# Patient Record
Sex: FEMALE | Race: WHITE | Employment: FULL TIME | URBAN - METROPOLITAN AREA
[De-identification: names, ages, dates, MRNs, and addresses within clinical notes are randomized per-mention and may not be internally consistent; named-entity substitution may affect disease eponyms.]

---

## 2017-01-16 ENCOUNTER — OFFICE VISIT (OUTPATIENT)
Dept: SURGERY | Age: 37
End: 2017-01-16

## 2017-01-16 VITALS
HEIGHT: 68 IN | SYSTOLIC BLOOD PRESSURE: 110 MMHG | RESPIRATION RATE: 18 BRPM | TEMPERATURE: 98.9 F | HEART RATE: 68 BPM | BODY MASS INDEX: 36.53 KG/M2 | WEIGHT: 241 LBS | OXYGEN SATURATION: 98 % | DIASTOLIC BLOOD PRESSURE: 70 MMHG

## 2017-01-16 DIAGNOSIS — K91.2 POSTOPERATIVE INTESTINAL MALABSORPTION: ICD-10-CM

## 2017-01-16 DIAGNOSIS — D50.8 OTHER IRON DEFICIENCY ANEMIA: ICD-10-CM

## 2017-01-16 DIAGNOSIS — E53.8 B12 DEFICIENCY: ICD-10-CM

## 2017-01-16 DIAGNOSIS — E55.9 VITAMIN D DEFICIENCY: ICD-10-CM

## 2017-01-16 DIAGNOSIS — K59.01 SLOW TRANSIT CONSTIPATION: ICD-10-CM

## 2017-01-16 DIAGNOSIS — Z09 FOLLOW-UP EXAMINATION FOLLOWING SURGERY: Primary | ICD-10-CM

## 2017-01-16 RX ORDER — LACTULOSE 10 G/15ML
10-20 SOLUTION ORAL; RECTAL
Qty: 480 ML | Refills: 2 | Status: SHIPPED | OUTPATIENT
Start: 2017-01-16 | End: 2017-06-26

## 2017-01-16 NOTE — PATIENT INSTRUCTIONS
Constipation:    Take 15-30 ml of the lactulose nightly     Decrease amount of cheese you are eating     Increase your water intake     Call me in 2 weeks with update

## 2017-01-16 NOTE — PROGRESS NOTES
Chief Complaint   Patient presents with    Surgical Follow-up     3 months s/p gastric bypass by Dr Frances Norwood. down 57 pounds,  lost 22  pounds. Patient is 3 months status post Malabsorptive gastric bypass for treatment of morbid obesity. She  has lost 57 lbs. Since surgery. Patient is satisfied with progress. Patient is consuming about 45 grams of protein daily. Most foods are well tolerated, but she doesn't always have time to eat. She in in her car \"alot\" and tends to grab easy items. Her primary complaint is constipation. She has been eating sugar free jelly beans to be able to have a BM. She says she has tried everything and that is all that works. She is eating a lot of cheese (3-4 cheese sticks per day). Patient is walking more daily for activity. Patient has had rare  episodes of dysphagia due to poor chewing, eating too quickly and/or other behavioral factors. No fever or chills, chest pain or shortness of breath. No reflux, night-time cough, heartburn or regurgitation. Constipated every 2 weeks eats sugar free jelly beans     Visit Vitals    /70    Pulse 68    Temp 98.9 °F (37.2 °C)    Resp 18    Ht 5' 8\" (1.727 m)    Wt 241 lb (109.3 kg)    SpO2 98%    BMI 36.64 kg/m2     A + O x 3  Chest  CTA  COR  RRR  ABD Soft, well healed; NT/ND, +BS, no masses or hernias. EXT No edema; ambulating independently      ICD-10-CM ICD-9-CM    1. Follow-up examination following surgery Z09 V67.00    2. Postoperative intestinal malabsorption K91.2 579.3 CBC W/O DIFF      VITAMIN B12 & FOLATE      VITAMIN D, 25 HYDROXY      IRON      METABOLIC PANEL, COMPREHENSIVE      CANCELED: METABOLIC PANEL, COMPREHENSIVE      CANCELED: IRON   3. Slow transit constipation K59.01 564.01    4. Vitamin D deficiency E55.9 268.9 CBC W/O DIFF      VITAMIN B12 & FOLATE      VITAMIN D, 25 HYDROXY      IRON      METABOLIC PANEL, COMPREHENSIVE      CANCELED: METABOLIC PANEL, COMPREHENSIVE      CANCELED: IRON   5. BMI 36.0-36.9,adult Z68.36 V85.36 CBC W/O DIFF      VITAMIN B12 & FOLATE      VITAMIN D, 25 HYDROXY      IRON      METABOLIC PANEL, COMPREHENSIVE      CANCELED: METABOLIC PANEL, COMPREHENSIVE      CANCELED: IRON   6. B12 deficiency E53.8 266.2 CBC W/O DIFF      VITAMIN B12 & FOLATE      VITAMIN D, 25 HYDROXY      IRON      METABOLIC PANEL, COMPREHENSIVE      CANCELED: METABOLIC PANEL, COMPREHENSIVE      CANCELED: IRON   7. Other iron deficiency anemia D50.8  CBC W/O DIFF      VITAMIN B12 & FOLATE      VITAMIN D, 25 HYDROXY      IRON      METABOLIC PANEL, COMPREHENSIVE      CANCELED: METABOLIC PANEL, COMPREHENSIVE      CANCELED: IRON     3 months s/p Malabsorptive gastric bypass for treatment of morbid obesity   Labs today   Reviewed diet (decrease cheese intake and no jelly beans)  Try lactulose for constipation, increase fiber and water intake   Follow up in 3 months   Daily walking   Continue vitamins   RD available   Rhenda Click verbalized understanding and questions were answered to the best of my knowledge and ability. Constipation  educational materials were provided.

## 2017-01-16 NOTE — MR AVS SNAPSHOT
Visit Information Date & Time Provider Department Dept. Phone Encounter #  
 1/16/2017 11:20 AM Curly Oneil NP [facility] GENERAL SURGERY [address] [phone] [id number] Upcoming Health Maintenance Date Due HEMOGLOBIN A1C Q6M 1978 FOOT EXAM Q1 10/11/1988 MICROALBUMIN Q1 10/11/1988 EYE EXAM RETINAL OR DILATED Q1 10/11/1988 Pneumococcal 19-64 Medium Risk (1 of 1 - PPSV23) 10/11/1997 DTaP/Tdap/Td series (1 - Tdap) 10/11/1999 PAP AKA CERVICAL CYTOLOGY 10/11/1999 INFLUENZA AGE 9 TO ADULT 8/1/2016 LIPID PANEL Q1 9/16/2017 Allergies as of 1/16/2017  Review Complete On: 1/16/2017 By: Curly Oneil NP Severity Noted Reaction Type Reactions Anaprox [Naproxen Sodium]  07/19/2016    Shortness of Breath Bactrim [Sulfamethoprim Ds]  07/19/2016    Hives Phenergan [Promethazine]  07/19/2016    Other (comments) IV form, agitation, muscle spasms Sulfa (Sulfonamide Antibiotics)  09/15/2016    Hives Current Immunizations  Reviewed on 10/10/2016 No immunizations on file. Not reviewed this visit You Were Diagnosed With   
  
 Codes Comments Postoperative intestinal malabsorption    -  Primary ICD-10-CM: K91.2 ICD-9-CM: 579.3 Vitamin D deficiency     ICD-10-CM: E55.9 ICD-9-CM: 268.9 B12 deficiency     ICD-10-CM: E53.8 ICD-9-CM: 266.2 Other iron deficiency anemia     ICD-10-CM: D50.8 BMI 36.0-36.9,adult     ICD-10-CM: E49.45 
ICD-9-CM: V85.36 Vitals BP Pulse Temp Resp Height(growth percentile) Weight(growth percentile) 110/70 68 98.9 °F (37.2 °C) 18 5' 8\" (1.727 m) 241 lb (109.3 kg) SpO2 BMI Smoking Status 98% 36.64 kg/m2 Never Smoker Vitals History BMI and BSA Data Body Mass Index Body Surface Area  
 36.64 kg/m 2 2.29 m 2 Preferred Pharmacy Pharmacy Name Phone [id number] [place] [place] - [address] [place] [address] Your Updated Medication List  
  
   
This list is accurate as of: 1/16/17 12:07 PM.  Always use your most recent med list.  
  
  
  
  
 aspirin delayed-release 81 mg tablet Take  by mouth daily. atorvastatin 20 mg tablet Commonly known as:  LIPITOR Take  by mouth daily. BETA CAROTENE PO Take  by mouth. BIOTIN PO Take  by mouth. buPROPion  mg SR tablet Commonly known as:  Rosa Clovis Take  by mouth two (2) times a day. Calcium-Cholecalciferol (D3) 500 mg(1,250mg) -400 unit Tab Take  by mouth. carvedilol 3.125 mg tablet Commonly known as:  Alessandra Buster Take  by mouth two (2) times daily (with meals). cyanocobalamin 1,000 mcg sublingual tablet Commonly known as:  VITAMIN B-12 Take 1,000 mcg by mouth daily. FISH OIL PO Take 1,000 mg by mouth. lactulose 10 gram/15 mL solution Commonly known as:  Magdalene Efrem Take 15-30 mL by mouth nightly. Constipation  
  
 lisinopril 2.5 mg tablet Commonly known as:  Ej Rolle Take  by mouth daily. MIRENA 20 mcg/24 hr (5 years) IUD Generic drug:  levonorgestrel 1 Each by IntraUTERine route once. multivitamin tablet Commonly known as:  ONE A DAY Take 1 Tab by mouth daily. Omeprazole delayed release 20 mg tablet Commonly known as:  PRILOSEC D/R Take 1 Tab by mouth daily. sertraline 50 mg tablet Commonly known as:  ZOLOFT Take  by mouth daily. traZODone 100 mg tablet Commonly known as:  Darling Hernández Take 100 mg by mouth nightly. warfarin 7.5 mg tablet Commonly known as:  COUMADIN Take 7.5 mg by mouth daily. 7.5 MG Monday Wednesday AND Friday - AND THEN 1/2 TAB OF THE 7.5MG Tuesday Thursday Saturday AND Sunday. PT INSTRUCTED TO STOP TAKING COUMADIN ON OCT. 5TH BY CARDIOLOLIST AND TO START LOVENOX ONCE A DAY UNTIL SURGERY AND CONTINUE UNTIL AFTER SURGERY. Prescriptions Sent to Pharmacy Refills lactulose (CHRONULAC) 10 gram/15 mL solution 2 Sig: Take 15-30 mL by mouth nightly. Constipation Class: Normal  
 [facility] [address] Ph #: [phone] Route: Oral  
  
We Performed the Following CBC W/O DIFF [20721 CPT(R)] IRON [id number] CPT(R)] METABOLIC PANEL, COMPREHENSIVE [20051 CPT(R)] VITAMIN B12 & FOLATE [40153 CPT(R)] VITAMIN D, 25 HYDROXY [id number] CPT(R)] Patient Instructions Constipation:   
Take 15-30 ml of the lactulose nightly Decrease amount of cheese you are eating Increase your water intake Call me in 2 weeks with update Introducing \[facility]\"" & HEALTH SERVICES! Dear Oscar Caban: Thank you for requesting a [employer] account. Our records indicate that you already have an active [employer] account. You can access your account anytime at [email or web address] Did you know that you can access your hospital and ER discharge instructions at any time in [employer]? You can also review all of your test results from your hospital stay or ER visit. Additional Information If you have questions, please visit the Frequently Asked Questions section of the [employer] website at [email or web address] Remember, [employer] is NOT to be used for urgent needs. For medical emergencies, dial 911. Now available from your iPhone and Android! Please provide this summary of care documentation to your next provider. Your primary care clinician is listed as Traci Cavazos. If you have any questions after today's visit, please call [phone].

## 2017-01-17 LAB
25(OH)D3+25(OH)D2 SERPL-MCNC: 58 NG/ML (ref 30–100)
ALBUMIN SERPL-MCNC: 4.3 G/DL (ref 3.5–5.5)
ALBUMIN/GLOB SERPL: 1.7 {RATIO} (ref 1.1–2.5)
ALP SERPL-CCNC: 126 IU/L (ref 39–117)
ALT SERPL-CCNC: 40 IU/L (ref 0–32)
AST SERPL-CCNC: 30 IU/L (ref 0–40)
BILIRUB SERPL-MCNC: 0.6 MG/DL (ref 0–1.2)
BUN SERPL-MCNC: 10 MG/DL (ref 6–20)
BUN/CREAT SERPL: 16 (ref 8–20)
CALCIUM SERPL-MCNC: 9.5 MG/DL (ref 8.7–10.2)
CHLORIDE SERPL-SCNC: 104 MMOL/L (ref 96–106)
CO2 SERPL-SCNC: 20 MMOL/L (ref 18–29)
CREAT SERPL-MCNC: 0.63 MG/DL (ref 0.57–1)
ERYTHROCYTE [DISTWIDTH] IN BLOOD BY AUTOMATED COUNT: 14.1 % (ref 12.3–15.4)
FOLATE SERPL-MCNC: >20 NG/ML
GLOBULIN SER CALC-MCNC: 2.6 G/DL (ref 1.5–4.5)
GLUCOSE SERPL-MCNC: 82 MG/DL (ref 65–99)
HCT VFR BLD AUTO: 42 % (ref 34–46.6)
HGB BLD-MCNC: 13.9 G/DL (ref 11.1–15.9)
IRON SERPL-MCNC: 48 UG/DL (ref 27–159)
MCH RBC QN AUTO: 29.1 PG (ref 26.6–33)
MCHC RBC AUTO-ENTMCNC: 33.1 G/DL (ref 31.5–35.7)
MCV RBC AUTO: 88 FL (ref 79–97)
PLATELET # BLD AUTO: 332 X10E3/UL (ref 150–379)
POTASSIUM SERPL-SCNC: 4.4 MMOL/L (ref 3.5–5.2)
PROT SERPL-MCNC: 6.9 G/DL (ref 6–8.5)
RBC # BLD AUTO: 4.77 X10E6/UL (ref 3.77–5.28)
SODIUM SERPL-SCNC: 143 MMOL/L (ref 134–144)
VIT B12 SERPL-MCNC: 677 PG/ML (ref 211–946)
WBC # BLD AUTO: 8 X10E3/UL (ref 3.4–10.8)

## 2017-06-26 ENCOUNTER — OFFICE VISIT (OUTPATIENT)
Dept: SURGERY | Age: 37
End: 2017-06-26

## 2017-06-26 VITALS
BODY MASS INDEX: 35.01 KG/M2 | HEART RATE: 65 BPM | DIASTOLIC BLOOD PRESSURE: 78 MMHG | WEIGHT: 231 LBS | TEMPERATURE: 99 F | OXYGEN SATURATION: 94 % | SYSTOLIC BLOOD PRESSURE: 128 MMHG | HEIGHT: 68 IN | RESPIRATION RATE: 18 BRPM

## 2017-06-26 DIAGNOSIS — L30.4 INTERTRIGO: ICD-10-CM

## 2017-06-26 DIAGNOSIS — E66.01 SEVERE OBESITY (BMI 35.0-35.9 WITH COMORBIDITY) (HCC): ICD-10-CM

## 2017-06-26 DIAGNOSIS — K91.2 POSTOPERATIVE INTESTINAL MALABSORPTION: Primary | ICD-10-CM

## 2017-06-26 RX ORDER — NYSTATIN 100000 U/G
CREAM TOPICAL
Qty: 30 G | Refills: 0 | Status: SHIPPED | OUTPATIENT
Start: 2017-06-26 | End: 2020-01-16 | Stop reason: ALTCHOICE

## 2017-06-26 RX ORDER — GABAPENTIN 100 MG/1
200 CAPSULE ORAL
COMMUNITY
Start: 2017-06-23 | End: 2020-01-16 | Stop reason: ALTCHOICE

## 2017-06-26 RX ORDER — OXYCODONE AND ACETAMINOPHEN 5; 325 MG/1; MG/1
TABLET ORAL
COMMUNITY
Start: 2017-06-22 | End: 2020-01-16 | Stop reason: ALTCHOICE

## 2017-06-26 RX ORDER — DOXYCYCLINE 100 MG/1
100 CAPSULE ORAL
COMMUNITY
Start: 2017-06-22 | End: 2017-07-02

## 2017-06-26 NOTE — MR AVS SNAPSHOT
Visit Information Date & Time Provider Department Dept. Phone Encounter #  
 6/26/2017 11:40 AM Madiha Davis NP [facility] GENERAL SURGERY [address] [phone] [id number] Upcoming Health Maintenance Date Due HEMOGLOBIN A1C Q6M 1978 FOOT EXAM Q1 10/11/1988 MICROALBUMIN Q1 10/11/1988 EYE EXAM RETINAL OR DILATED Q1 10/11/1988 Pneumococcal 19-64 Medium Risk (1 of 1 - PPSV23) 10/11/1997 DTaP/Tdap/Td series (1 - Tdap) 10/11/1999 PAP AKA CERVICAL CYTOLOGY 10/11/1999 INFLUENZA AGE 9 TO ADULT 8/1/2017 LIPID PANEL Q1 9/16/2017 Allergies as of 6/26/2017  Review Complete On: 1/16/2017 By: Madiha Davis NP Severity Noted Reaction Type Reactions Anaprox [Naproxen Sodium]  07/19/2016    Shortness of Breath Bactrim [Sulfamethoprim Ds]  07/19/2016    Hives Phenergan [Promethazine]  07/19/2016    Other (comments) IV form, agitation, muscle spasms Sulfa (Sulfonamide Antibiotics)  09/15/2016    Hives Current Immunizations  Reviewed on 10/10/2016 No immunizations on file. Not reviewed this visit You Were Diagnosed With   
  
 Codes Comments Intertrigo    -  Primary ICD-10-CM: L30.4 ICD-9-CM: 695.89 Vitals BP Pulse Temp Resp Height(growth percentile) Weight(growth percentile) 128/78 65 99 °F (37.2 °C) 18 5' 8\" (1.727 m) 231 lb (104.8 kg) SpO2 BMI Smoking Status 94% 35.12 kg/m2 Never Smoker Vitals History BMI and BSA Data Body Mass Index Body Surface Area  
 35.12 kg/m 2 2.24 m 2 Preferred Pharmacy [facility] Name Phone [id number] Your Updated Medication List  
  
   
This list is accurate as of: 6/26/17 12:40 PM.  Always use your most recent med list.  
  
  
  
  
 aspirin delayed-release 81 mg tablet Take  by mouth daily. atorvastatin 20 mg tablet Commonly known as:  LIPITOR Take  by mouth daily. BETA CAROTENE PO Take  by mouth. BIOTIN PO Take  by mouth. buPROPion  mg SR tablet Commonly known as:  Gloria Gobble Take  by mouth two (2) times a day. Calcium-Cholecalciferol (D3) 500 mg(1,250mg) -400 unit Tab Take  by mouth. carvedilol 3.125 mg tablet Commonly known as:  Yanelis [place] Take  by mouth two (2) times daily (with meals). cyanocobalamin 1,000 mcg sublingual tablet Commonly known as:  VITAMIN B-12 Take 1,000 mcg by mouth daily. doxycycline 100 mg capsule Commonly known as:  Jeffory Squibb Take 100 mg by mouth. FISH OIL PO Take 1,000 mg by mouth.  
  
 gabapentin 100 mg capsule Commonly known as:  NEURONTIN Take 200 mg by mouth.  
  
 lisinopril 2.5 mg tablet Commonly known as:  Maury Daft Take  by mouth daily. MIRENA 20 mcg/24 hr (5 years) IUD Generic drug:  levonorgestrel 1 Each by IntraUTERine route once. multivitamin tablet Commonly known as:  ONE A DAY Take 1 Tab by mouth daily. nystatin topical cream  
Commonly known as:  MYCOSTATIN Apply  to affected area two (2) times daily as needed for Skin Irritation. oxyCODONE-acetaminophen 5-325 mg per tablet Commonly known as:  PERCOCET Take  by mouth. sertraline 50 mg tablet Commonly known as:  ZOLOFT Take  by mouth daily. traZODone 100 mg tablet Commonly known as:  Justice Tello Take 100 mg by mouth nightly. warfarin 7.5 mg tablet Commonly known as:  COUMADIN Take 7.5 mg by mouth daily. 7.5 MG Monday Wednesday AND Friday - AND THEN 1/2 TAB OF THE 7.5MG Tuesday Thursday Saturday AND Sunday. PT INSTRUCTED TO STOP TAKING COUMADIN ON OCT. 5TH BY CARDIOLOLIST AND TO START LOVENOX ONCE A DAY UNTIL SURGERY AND CONTINUE UNTIL AFTER SURGERY. Prescriptions Sent to Pharmacy  Refills  
 nystatin (MYCOSTATIN) topical cream 0  
 Sig: Apply  to affected area two (2) times daily as needed for Skin Irritation. Class: Normal  
 [facility] [address]  #: [phone] Route: Topical  
  
Patient Instructions Consider Yoga - it is great for the mind, body and spirit. May even help with the headaches / stress Mind Body Edgard on your smart phone Stay on your vitamins daily Work on meal planning for dinner. Use the Smart Ones or similar, prepared salads with deli chicken or turkey, chili or even a protein shake/bar During the day of you are eating a meal with at least 20 grams protein, no shake needed Get outdoors daily Introducing [facility] & [facility] SERVICES! Dear Susy Armenta: Thank you for requesting a [employer] account. Our records indicate that you already have an active [employer] account. You can access your account anytime at [email or web address]. Send [email or web address] Did you know that you can access your hospital and ER discharge instructions at any time in [employer]? You can also review all of your test results from your hospital stay or ER visit. Additional Information If you have questions, please visit the Frequently Asked Questions section of the [employer] website at [email or web address]. [email or web address] Remember, [employer] is NOT to be used for urgent needs. For medical emergencies, dial 911. Now available from your iPhone and Android! Please provide this summary of care documentation to your next provider. Your primary care clinician is listed as Joie Miguel. If you have any questions after today's visit, please call [phone].

## 2017-06-26 NOTE — PROGRESS NOTES
Chief Complaint   Patient presents with    Surgical Follow-up     8 months s/p gastric bypass by Dr Thi Sanchez. down 67  pounds,   lost  10 pounds. Patient is 8 months status post Malabsorptive gastric bypass for treatment of morbid obesity. Presents today for routine follow-up. Patient has lost 67 lbs. Since surgery. She feels like she is at a stand still the past few months. No nausea or vomiting. No abdominal pain. she is stressed with work and not much exercise. Eating is on the go. She sometimes skips dinner and will have crackers or snack item because she is too tired to prepare anything. Patient is consuming 90+ grams of protein daily. She is drinking 3 premiere clear shakes per day + foods. Breakfast and lunch are usually a frozen meal.  Most foods are well tolerated. Bowels moving daily. Patient has had rare episodes of dysphagia due to poor chewing, eating too quickly and/or other behavioral factors. She is having issues with skin and itching under the pannus. She tries to keep dry with powders and creams, but it gets painful and musty smelling. She also has had a headache for months. She is working with her pcp to treat. She has had an eye exam and a recent full lab work up including vitamins. Co-Morbid(s)     Resolved      Was anti coagulation initiated for presumed / confirmed DVT/PE?     NO (on Coumadin for aortic valve replacement)    Was an incisional hernia noted on exam?       NO      COMORBIDITY     SLEEP APNEA                 NO        GERD  (req.meds)           NO  HYPERLIPIDEMIA           YES  HYPERTENSION             YES       IF YES, # OF HTN MEDICATIONS 1  DIABETES                        NO      IF YES, 0 NON-INSULIN   0 INSULIN     Visit Vitals    /78    Pulse 65    Temp 99 °F (37.2 °C)    Resp 18    Ht 5' 8\" (1.727 m)    Wt 231 lb (104.8 kg)    SpO2 94%    BMI 35.12 kg/m2     A + O x 3  Chest  CTA  COR  RRR  ABD Soft, NT/ND, +BS, no masses or hernias. Grade II pannus   EXT No edema; ambulating independently      ICD-10-CM ICD-9-CM    1. Postoperative intestinal malabsorption K91.2 579.3    2. Intertrigo L30.4 695.89 nystatin (MYCOSTATIN) topical cream   3. BMI 35.0-35.9,adult Z68.35 V85.35    4. Severe obesity (BMI 35.0-35.9 with comorbidity) ([facility]) E66.01 278.01     Z68.35 V85.35      8 months s/p Malabsorptive gastric bypass for treatment of morbid obesity   Reviewed diet and suggestions for meal prep / planning   RD available   Avoid snacking   Cut back on shakes and use only as meal replacement   Daily activity - yoga / stress reduction  Nystatin cream bid under pannus and folds for skin and hygiene reviewed   Follow up in 3 months   Americo Mckeon verbalized understanding and questions were answered to the best of my knowledge and ability. Skin care  educational materials were provided. 17 minutes spent in face to face with Americo Mckeon > 50% counseling.

## 2017-06-26 NOTE — PROGRESS NOTES
1. Have you been to the ER, urgent care clinic since your last visit? Hospitalized since your last visit?  no    2. Have you seen or consulted any other health care providers outside of the [address] [place] [address] since your last visit? Include any pap smears or colon screening.    no

## 2018-08-13 ENCOUNTER — TELEPHONE (OUTPATIENT)
Dept: SURGERY | Age: 38
End: 2018-08-13

## 2019-08-12 ENCOUNTER — TELEPHONE (OUTPATIENT)
Dept: SURGERY | Age: 39
End: 2019-08-12

## 2019-10-03 ENCOUNTER — HOSPITAL ENCOUNTER (OUTPATIENT)
Dept: CT IMAGING | Age: 39
Discharge: HOME | End: 2019-10-03
Attending: UROLOGY
Payer: COMMERCIAL

## 2019-10-03 DIAGNOSIS — R31.0 GROSS HEMATURIA: ICD-10-CM

## 2019-10-03 PROCEDURE — 74178 CT ABD&PLV WO CNTR FLWD CNTR: CPT

## 2019-10-03 PROCEDURE — 74011636320 HC RX REV CODE- 636/320: Performed by: RADIOLOGY

## 2019-10-03 PROCEDURE — 74011000258 HC RX REV CODE- 258: Performed by: RADIOLOGY

## 2019-10-03 RX ORDER — SODIUM CHLORIDE 0.9 % (FLUSH) 0.9 %
10 SYRINGE (ML) INJECTION
Status: COMPLETED | OUTPATIENT
Start: 2019-10-03 | End: 2019-10-03

## 2019-10-03 RX ADMIN — SODIUM CHLORIDE 100 ML: 900 INJECTION, SOLUTION INTRAVENOUS at 11:31

## 2019-10-03 RX ADMIN — Medication 10 ML: at 11:31

## 2019-10-03 RX ADMIN — IOPAMIDOL 100 ML: 612 INJECTION, SOLUTION INTRAVENOUS at 11:31

## 2020-01-16 ENCOUNTER — OFFICE VISIT (OUTPATIENT)
Dept: BEHAVIORAL/MENTAL HEALTH CLINIC | Age: 40
End: 2020-01-16

## 2020-01-16 VITALS
WEIGHT: 257 LBS | SYSTOLIC BLOOD PRESSURE: 118 MMHG | HEIGHT: 68 IN | DIASTOLIC BLOOD PRESSURE: 73 MMHG | HEART RATE: 66 BPM | BODY MASS INDEX: 38.95 KG/M2

## 2020-01-16 DIAGNOSIS — F41.0 ANXIETY DISORDER DUE TO GENERAL MEDICAL CONDITION WITH PANIC ATTACK: ICD-10-CM

## 2020-01-16 DIAGNOSIS — G47.00 INSOMNIA DISORDER, WITH OTHER SLEEP DISORDER, RECURRENT: ICD-10-CM

## 2020-01-16 DIAGNOSIS — G47.8 INSOMNIA DISORDER, WITH OTHER SLEEP DISORDER, RECURRENT: ICD-10-CM

## 2020-01-16 DIAGNOSIS — F06.4 ANXIETY DISORDER DUE TO GENERAL MEDICAL CONDITION WITH PANIC ATTACK: ICD-10-CM

## 2020-01-16 DIAGNOSIS — F33.0 MAJOR DEPRESSIVE DISORDER, RECURRENT EPISODE, MILD (HCC): Primary | ICD-10-CM

## 2020-01-16 RX ORDER — ALPRAZOLAM 0.25 MG/1
0.25 TABLET ORAL
Qty: 60 TAB | Refills: 2 | Status: SHIPPED | OUTPATIENT
Start: 2020-01-16 | End: 2020-04-24 | Stop reason: SDUPTHER

## 2020-01-16 RX ORDER — FLUOXETINE HYDROCHLORIDE 20 MG/1
20 CAPSULE ORAL DAILY
COMMUNITY
Start: 2018-04-05 | End: 2020-01-16 | Stop reason: DRUGHIGH

## 2020-01-16 RX ORDER — LAMOTRIGINE 25 MG/1
75 TABLET ORAL DAILY
COMMUNITY
Start: 2018-08-03 | End: 2020-01-16 | Stop reason: DRUGHIGH

## 2020-01-16 RX ORDER — OMEPRAZOLE 40 MG/1
40 CAPSULE, DELAYED RELEASE ORAL DAILY
COMMUNITY
Start: 2019-06-06

## 2020-01-16 RX ORDER — FLUOXETINE HYDROCHLORIDE 40 MG/1
40 CAPSULE ORAL DAILY
Qty: 90 CAP | Refills: 2 | Status: SHIPPED | OUTPATIENT
Start: 2020-01-16 | End: 2020-08-21 | Stop reason: SDUPTHER

## 2020-01-16 RX ORDER — LAMOTRIGINE 100 MG/1
100 TABLET ORAL DAILY
Qty: 90 TAB | Refills: 2 | Status: SHIPPED | OUTPATIENT
Start: 2020-01-16 | End: 2020-08-21 | Stop reason: SDUPTHER

## 2020-01-16 RX ORDER — TRAZODONE HYDROCHLORIDE 100 MG/1
100 TABLET ORAL
Qty: 90 TAB | Refills: 2 | Status: SHIPPED | OUTPATIENT
Start: 2020-01-16 | End: 2020-08-21 | Stop reason: SDUPTHER
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
## 2020-02-26 ENCOUNTER — OFFICE VISIT (OUTPATIENT)
Dept: BEHAVIORAL/MENTAL HEALTH CLINIC | Age: 40
End: 2020-02-26

## 2020-02-26 VITALS
HEIGHT: 68 IN | BODY MASS INDEX: 39.1 KG/M2 | DIASTOLIC BLOOD PRESSURE: 61 MMHG | HEART RATE: 69 BPM | SYSTOLIC BLOOD PRESSURE: 118 MMHG | WEIGHT: 258 LBS

## 2020-02-26 DIAGNOSIS — F06.4 ANXIETY DISORDER DUE TO GENERAL MEDICAL CONDITION WITH PANIC ATTACK: Primary | ICD-10-CM

## 2020-02-26 DIAGNOSIS — F41.0 ANXIETY DISORDER DUE TO GENERAL MEDICAL CONDITION WITH PANIC ATTACK: Primary | ICD-10-CM

## 2020-02-26 DIAGNOSIS — F33.0 MAJOR DEPRESSIVE DISORDER, RECURRENT EPISODE, MILD (HCC): ICD-10-CM

## 2020-02-26 RX ORDER — BUSPIRONE HYDROCHLORIDE 10 MG/1
10 TABLET ORAL 3 TIMES DAILY
Qty: 90 TAB | Refills: 2 | Status: SHIPPED | OUTPATIENT
Start: 2020-02-26 | End: 2020-04-24
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
## 2020-04-24 ENCOUNTER — VIRTUAL VISIT (OUTPATIENT)
Dept: BEHAVIORAL/MENTAL HEALTH CLINIC | Age: 40
End: 2020-04-24

## 2020-04-24 DIAGNOSIS — F41.0 ANXIETY DISORDER DUE TO GENERAL MEDICAL CONDITION WITH PANIC ATTACK: Primary | ICD-10-CM

## 2020-04-24 DIAGNOSIS — F33.0 MAJOR DEPRESSIVE DISORDER, RECURRENT EPISODE, MILD (HCC): ICD-10-CM

## 2020-04-24 DIAGNOSIS — F06.4 ANXIETY DISORDER DUE TO GENERAL MEDICAL CONDITION WITH PANIC ATTACK: Primary | ICD-10-CM

## 2020-04-24 RX ORDER — OMEPRAZOLE 40 MG/1
CAPSULE, DELAYED RELEASE ORAL
COMMUNITY
Start: 2020-02-07

## 2020-04-24 RX ORDER — BUSPIRONE HYDROCHLORIDE 10 MG/1
10 TABLET ORAL 3 TIMES DAILY
Qty: 90 TAB | Refills: 5 | Status: SHIPPED | OUTPATIENT
Start: 2020-04-24 | End: 2020-05-26 | Stop reason: SDUPTHER

## 2020-04-24 RX ORDER — FLUTICASONE PROPIONATE 50 MCG
SPRAY, SUSPENSION (ML) NASAL
COMMUNITY
Start: 2020-04-14

## 2020-04-24 RX ORDER — MEDROXYPROGESTERONE ACETATE 150 MG/ML
150 INJECTION, SUSPENSION INTRAMUSCULAR
COMMUNITY
Start: 2018-08-03

## 2020-04-24 RX ORDER — TOPIRAMATE 25 MG/1
25 CAPSULE, COATED PELLETS ORAL 3 TIMES DAILY
COMMUNITY
Start: 2018-08-03

## 2020-04-24 RX ORDER — ALPRAZOLAM 0.25 MG/1
0.25 TABLET ORAL
Qty: 60 TAB | Refills: 2 | Status: SHIPPED | OUTPATIENT
Start: 2020-04-24 | End: 2020-08-21 | Stop reason: SDUPTHER

## 2020-04-24 RX ORDER — CARVEDILOL 3.12 MG/1
3.12 TABLET ORAL 2 TIMES DAILY
COMMUNITY
Start: 2020-02-10

## 2020-04-24 RX ORDER — WARFARIN 7.5 MG/1
7.5 TABLET ORAL DAILY
COMMUNITY
Start: 2020-02-10
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
## 2020-05-26 DIAGNOSIS — F06.4 ANXIETY DISORDER DUE TO GENERAL MEDICAL CONDITION WITH PANIC ATTACK: ICD-10-CM

## 2020-05-26 DIAGNOSIS — F33.0 MAJOR DEPRESSIVE DISORDER, RECURRENT EPISODE, MILD (HCC): ICD-10-CM

## 2020-05-26 DIAGNOSIS — F41.0 ANXIETY DISORDER DUE TO GENERAL MEDICAL CONDITION WITH PANIC ATTACK: ICD-10-CM

## 2020-05-26 RX ORDER — BUSPIRONE HYDROCHLORIDE 10 MG/1
10 TABLET ORAL 3 TIMES DAILY
Qty: 270 TAB | Refills: 0 | Status: SHIPPED | OUTPATIENT
Start: 2020-05-26

## 2020-08-11 ENCOUNTER — TELEPHONE (OUTPATIENT)
Dept: SURGERY | Age: 40
End: 2020-08-11

## 2020-08-21 DIAGNOSIS — F33.0 MAJOR DEPRESSIVE DISORDER, RECURRENT EPISODE, MILD (HCC): ICD-10-CM

## 2020-08-21 DIAGNOSIS — F41.0 ANXIETY DISORDER DUE TO GENERAL MEDICAL CONDITION WITH PANIC ATTACK: ICD-10-CM

## 2020-08-21 DIAGNOSIS — G47.00 INSOMNIA DISORDER, WITH OTHER SLEEP DISORDER, RECURRENT: ICD-10-CM

## 2020-08-21 DIAGNOSIS — G47.8 INSOMNIA DISORDER, WITH OTHER SLEEP DISORDER, RECURRENT: ICD-10-CM

## 2020-08-21 DIAGNOSIS — F06.4 ANXIETY DISORDER DUE TO GENERAL MEDICAL CONDITION WITH PANIC ATTACK: ICD-10-CM

## 2020-08-21 RX ORDER — LAMOTRIGINE 100 MG/1
100 TABLET ORAL DAILY
Qty: 90 TAB | Refills: 2 | Status: SHIPPED | OUTPATIENT
Start: 2020-08-21

## 2020-08-21 RX ORDER — FLUOXETINE HYDROCHLORIDE 40 MG/1
40 CAPSULE ORAL DAILY
Qty: 90 CAP | Refills: 2 | Status: SHIPPED | OUTPATIENT
Start: 2020-08-21

## 2020-08-21 RX ORDER — ALPRAZOLAM 0.25 MG/1
0.25 TABLET ORAL
Qty: 60 TAB | Refills: 2 | Status: SHIPPED | OUTPATIENT
Start: 2020-08-21

## 2020-08-21 RX ORDER — TRAZODONE HYDROCHLORIDE 100 MG/1
100 TABLET ORAL
Qty: 90 TAB | Refills: 2 | Status: SHIPPED | OUTPATIENT
Start: 2020-08-21

## 2022-03-18 PROBLEM — F06.4 ANXIETY DISORDER DUE TO GENERAL MEDICAL CONDITION WITH PANIC ATTACK: Status: ACTIVE | Noted: 2020-01-16

## 2022-03-18 PROBLEM — F41.0 ANXIETY DISORDER DUE TO GENERAL MEDICAL CONDITION WITH PANIC ATTACK: Status: ACTIVE | Noted: 2020-01-16

## 2022-03-19 PROBLEM — F33.0 MAJOR DEPRESSIVE DISORDER, RECURRENT EPISODE, MILD (HCC): Status: ACTIVE | Noted: 2020-01-16

## 2023-05-16 RX ORDER — WARFARIN SODIUM 7.5 MG/1
7.5 TABLET ORAL DAILY
COMMUNITY
Start: 2020-02-10

## 2023-05-16 RX ORDER — FLUOXETINE HYDROCHLORIDE 40 MG/1
40 CAPSULE ORAL DAILY
COMMUNITY
Start: 2020-08-21

## 2023-05-16 RX ORDER — FLUTICASONE PROPIONATE 50 MCG
SPRAY, SUSPENSION (ML) NASAL
COMMUNITY
Start: 2020-04-14

## 2023-05-16 RX ORDER — ALPRAZOLAM 0.25 MG/1
0.25 TABLET ORAL 2 TIMES DAILY PRN
COMMUNITY
Start: 2020-08-21

## 2023-05-16 RX ORDER — TRAZODONE HYDROCHLORIDE 100 MG/1
100 TABLET ORAL
COMMUNITY
Start: 2020-08-21

## 2023-05-16 RX ORDER — ASPIRIN 81 MG/1
TABLET ORAL DAILY
COMMUNITY

## 2023-05-16 RX ORDER — CARVEDILOL 3.12 MG/1
3.12 TABLET ORAL 2 TIMES DAILY
COMMUNITY
Start: 2020-02-10

## 2023-05-16 RX ORDER — OMEPRAZOLE 40 MG/1
1 CAPSULE, DELAYED RELEASE ORAL DAILY
COMMUNITY
Start: 2019-06-06

## 2023-05-16 RX ORDER — TOPIRAMATE 25 MG/1
25 CAPSULE, COATED PELLETS ORAL 3 TIMES DAILY
COMMUNITY
Start: 2018-08-03

## 2023-05-16 RX ORDER — MEDROXYPROGESTERONE ACETATE 150 MG/ML
150 INJECTION, SUSPENSION INTRAMUSCULAR
COMMUNITY
Start: 2018-08-03

## 2023-05-16 RX ORDER — BUSPIRONE HYDROCHLORIDE 10 MG/1
10 TABLET ORAL 3 TIMES DAILY
COMMUNITY
Start: 2020-05-26

## 2023-05-16 RX ORDER — LAMOTRIGINE 100 MG/1
100 TABLET ORAL DAILY
COMMUNITY
Start: 2020-08-21